# Patient Record
Sex: FEMALE | Race: BLACK OR AFRICAN AMERICAN | Employment: PART TIME | URBAN - METROPOLITAN AREA
[De-identification: names, ages, dates, MRNs, and addresses within clinical notes are randomized per-mention and may not be internally consistent; named-entity substitution may affect disease eponyms.]

---

## 2018-05-07 ENCOUNTER — HOSPITAL ENCOUNTER (OUTPATIENT)
Dept: PHYSICAL THERAPY | Age: 43
End: 2018-05-07

## 2018-05-09 ENCOUNTER — HOSPITAL ENCOUNTER (OUTPATIENT)
Dept: PHYSICAL THERAPY | Age: 43
Discharge: HOME | End: 2018-05-09
Payer: COMMERCIAL

## 2018-05-09 PROCEDURE — 97016 VASOPNEUMATIC DEVICE THERAPY: CPT

## 2018-05-09 PROCEDURE — 97161 PT EVAL LOW COMPLEX 20 MIN: CPT

## 2018-05-09 PROCEDURE — 97110 THERAPEUTIC EXERCISES: CPT

## 2018-05-09 NOTE — PROGRESS NOTES
[facility] PHYSICAL THERAPY AT [facility] [address], [address] - Phone: [phone]  Fax: [phone] / [address] [facility]  Patient Name: Titus Laguna : 1975   Medical   Diagnosis: Pain in right knee  M25.561 Treatment Diagnosis: Pain in right knee  M25.561   Onset Date: 3/19/18     Referral Source: Davion Malin MD [facility]): 2018   Prior Hospitalization: See medical history Provider #: [id number]   Prior Level of Function: Independent ambulation; reciprocal stair negotiation   Comorbidities: NA   Medications: Verified on Patient Summary List   The Plan of Care and following information is based on the information from the initial evaluation.   ===========================================================================================  Assessment / key information:  Patient is a 38 y.o. female who presents to [facility] with a diagnosis of Total knee replacement status, right [Z96.651]. Patient is her own historian. Living situation is as follows: lives with daughter in a 2 story house. Occupation: housekeeping. Pt presents with right knee pain, swelling, decreased mobility and strength s/p right TKR on 3/19/18. Pt had home health PT for 4 weeks and continues with the exercises provided to her during that time. She uses a SC to ambulate in the community and no AD at home. Pt negotiates her stairs with a step to pattern and SC. She c/o localized anterior knee pain and infrapatellar pain. No referral pattern/numbness/LOB/falls. Pt does worry about falling secondary to right knee buckling at home, but was able to correct with SC. She is currently out of work until the knee is better. She is in the process of purchasing a rubber bath mat to provide traction in the shower with bathing.     Current Deficits include: pain, edema, decreased mobility, decreased strength, and decreased postural awareness with resulting limitations in ADL's and in functional abilities. Impairments are as follows:     Pain 0/10 VAS; + hypersensitivity medial and anterior aspect of the right knee at incision     Posture BLE genu valgum and mild pes planus; left lateral trunk lean and flexed hips    Observations: incision clean, dry, intact; hypomobility right PF joint caudally    Gait: antalgic with SC and left lateral trunk lean with discontinuous steps with decreased step length LLE with decreased weightshifting onto RLE in midstance; increased right knee flexion in midstance and decreased knee flexion throughout swing phase; increased lateral propulsion and decreased BLE hip extension at terminal stance    AROM/PROM:  Left knee WNLs  Right knee: extension -8/-5 flexion 115/118    Strength:  3-/5 bilateral hip extension  Left knee flexion 4/5 extension 5/5  Right knee flexion 3+/5 extension 3+/5    Special Tests:  + Clarkes test right knee  Circumferential measures at superior patellar poles L 45cm R 47cm    Functional Tests: unable to perform a squat    Functional Deficits include: step to stair negotiation; ambulation with SC. Patient's FOTO score was a 28/100 indicating decreased function.  Patient will benefit from a POC addressing such impairments and limitations in order to improve quality of life and return to OF.    ==================================================================================  Eval Complexity: History: LOW Complexity : Zero comorbidities / personal factors that will impact the outcome / POCExam:MEDIUM Complexity : 3 Standardized tests and measures addressing body structure, function, activity limitation and / or participation in recreation  Presentation: LOW Complexity : Stable, uncomplicated  Clinical Decision Making:MEDIUM Complexity : FOTO score of 26-74Overall Complexity:LOW   Problem List: pain affecting function, decrease ROM, decrease strength, edema affecting function, impaired gait/ balance, decrease ADL/ functional abilitiies, decrease activity tolerance, decrease flexibility/ joint mobility and decrease transfer abilities   Treatment Plan may include any combination of the following: Therapeutic exercise, Therapeutic activities, Neuromuscular re-education, Physical agent/modality, Gait/balance training, Manual therapy, Patient education, Self Care training, Functional mobility training, Home safety training and Stair training  Patient / Family readiness to learn indicated by: asking questions, trying to perform skills and interest  Persons(s) to be included in education: patient (P)  Barriers to Learning/Limitations: None  Measures taken: A HEP was initiated to assist with POC in restoring function; vaso to the right knee 15min; instruction in desensitization techniques   Patient Goal (s): Walk without a cane   Patient self reported health status: good  Rehabilitation Potential: excellent     Short Term Goals: To be accomplished in  2  treatments:  1. Pt will be compliant with HEP for symptom management at home.  Long Term Goals: To be accomplished in  8-12  treatments:  1. Pt will demonstrate an increased FOTO score to 53/100 in order to improve function  2. Pt will be independent with HEP at D/C for self management. 3. Pt will demonstrate decreased edema in the right knee by 2cm in order to increase mobility necessary for stair negotiation and ambulation  4. Pt will demonstrate increased right knee AROM into extension to between 5-0 in order to aid in weightbearing and erect posture during midstance  5. Pt will demonstrate increased right knee flexion and extension strength to > or equal to 4/5 respectively in order to aid in ambulation and functional household tasks  6. Pt will be able to ambulate 10 minutes continuously without AD/LE buckling/pain in order to return to OF  7.  Pt will negotiate 12, 6in stairs with unilateral rail without SC with reciprocal pattern in order to more easily access her second floor    Frequency / Duration:   Patient to be seen  2  times per week for 8-12  treatments:  Patient / Caregiver education and instruction: exercises  G-Trevin (GP):   Therapist Signature: Nitesh Lassiter PT Date: 8/5/8105   Certification Period:  Time: 6:17 PM   ===========================================================================================  I certify that the above Physical Therapy Services are being furnished while the patient is under my care. I agree with the treatment plan and certify that this therapy is necessary. Physician Signature:        Date:       Time:     Please sign and return to [facility] or you may fax the signed copy to  [phone]. Thank you.

## 2018-05-09 NOTE — PROGRESS NOTES
PT KNEE EVAL AND TREATMENT    Patient Name: Raina Guerrero  Date:2018  : 1975  [x]  Patient  Verified  Payor: Noemi Anthony / Plan: [facility]  CAPITATED PT / Product Type: Commerical /    In time:535  Out time:630  Total Treatment Time (min): 55  Total Timed Codes (min): 40  1:1 Treatment Time ( only):    Visit #: 1 of     Treatment Area: [place] [address] pain in right knee  SUBJECTIVE  Pain Level (0-10 on visual analog scale): 0  Any medication changes, allergies to medications, diagnosis change, or new procedure performed: see summary sheet for update  Subjective functional status/changes:  Patient is a 38 y.o. female who presents to [facility] with a diagnosis of Total knee replacement status, right [Z96.651]. Patient is her own historian. Living situation is as follows: lives with daughter in a 2 story house. Occupation: housekeeping. Pt presents with right knee pain, swelling, decreased mobility and strength s/p right TKR on 3/19/18. Pt had home health PT for 4 weeks and continues with the exercises provided to her during that time. She uses a SC to ambulate in the community and no AD at home. Pt negotiates her stairs with a step to pattern and SC. She c/o localized anterior knee pain and infrapatellar pain. No referral pattern/numbness/LOB/falls. Pt does worry about falling secondary to right knee buckling at home, but was able to correct with SC. She is currently out of work until the knee is better. She is in the process of purchasing a rubber bath mat to provide traction in the shower with bathing. Current Deficits include: pain, edema, decreased mobility, decreased strength, and decreased postural awareness with resulting limitations in ADL's and in functional abilities.      Impairments are as follows:     Pain 0/10 VAS; + hypersensitivity medial and anterior aspect of the right knee at incision     Posture BLE genu valgum and mild pes planus; left lateral trunk lean and flexed hips    Observations: incision clean, dry, intact; hypomobility right PF joint caudally    Gait: antalgic with SC and left lateral trunk lean with discontinuous steps with decreased step length LLE with decreased weightshifting onto RLE in midstance; increased right knee flexion in midstance and decreased knee flexion throughout swing phase; increased lateral propulsion and decreased BLE hip extension at terminal stance    AROM/PROM:  Left knee WNLs  Right knee: extension -8/-5 flexion 115/118    Strength:  3-/5 bilateral hip extension  Left knee flexion 4/5 extension 5/5  Right knee flexion 3+/5 extension 3+/5    Special Tests:  + Clarkes test right knee  Circumferential measures at superior patellar poles L 45cm R 47cm    Functional Tests: unable to perform a squat    Modality (rationale): 15 min vaso to the right knee while elevated-skin check normal after    []  E-Stim: type _ x _ min     []att   []unatt   []w/US   []w/ice   []w/heat  []  Traction: []cerv   []pelvic   _ lbs x _ min     []pro   []sup   []int   []const  []  Ultrasound: []cont   []pulse    _ W/cm2 x _  min   []1MHz   []3MHz  []  Iontophoresis: []take home patch w/ dexamethazone    []40mA   []80mA                               []_ mA min w/: []dexamethazone   []other:_  []  Ice pack _  min     [] Hot pack _  min     [] Paraffin _  min  []  Other:     Patient Education: [x] Established HEP    [x] POT (minutes) :10 min desensitization techniques; HEP    Pain Level (0-10 scale) post treatment: 0    ASSESSMENT  [x]  See Plan of Care    PLAN  [x]  Upgrade activities as tolerated     [x] Other:_    See POC for Frequency/duration of visits     Justification for Eval Code Complexity:   Patient History : see POC  Examination: (see exam)  Clinical Presentation: stable  Clinical Decision Making : FOTO : 28 /100     G-Codes (GP):     Antonia Rendon, PT 5/9/2018  6:36 PM

## 2018-05-15 ENCOUNTER — HOSPITAL ENCOUNTER (OUTPATIENT)
Dept: PHYSICAL THERAPY | Age: 43
Discharge: HOME | End: 2018-05-15
Payer: COMMERCIAL

## 2018-05-15 PROCEDURE — 97140 MANUAL THERAPY 1/> REGIONS: CPT

## 2018-05-15 PROCEDURE — 97110 THERAPEUTIC EXERCISES: CPT

## 2018-05-15 PROCEDURE — 97016 VASOPNEUMATIC DEVICE THERAPY: CPT

## 2018-05-15 NOTE — PROGRESS NOTES
PHYSICAL THERAPY - DAILY TREATMENT NOTE    Patient Name: Jaimee Headley        Date: 5/15/2018  : 1975   YES Patient  Verified  Visit #:   2   of     Insurance: Payor: Lamont Lomeli / Plan:  [address] PT / Product Type: Commerical /      In time: 8:27am Out time: 9:17am   Total Treatment Time: 50     Medicare Time Tracking (below)   Total Timed Codes (min):  n/a 1:1 Treatment Time:  n/a     TREATMENT AREA =  Pain in right knee [M25.561]    SUBJECTIVE  Pain Level (on 0 to 10 scale):  8  / 10   Medication Changes/New allergies or changes in medical history, any new surgeries or procedures? NO    If yes, update Summary List   Subjective Functional Status/Changes:  []  No changes reported     \"last night was a bad night all night it was unbelieveable I havent had a night like that since the         OBJECTIVE  Modalities Rationale:     decrease inflammation and decrease pain to improve patient's ability to perform ADLs.      min [] Estim, type/location:                                      []  att     []  unatt     []  w/US     []  w/ice    []  w/heat    min []  Mechanical Traction: type/lbs                   []  pro   []  sup   []  int   []  cont    []  before manual    []  after manual    min []  Ultrasound, settings/location:      min []  Iontophoresis w/ dexamethasone, location:                                               []  take home patch       []  in clinic    min []  Ice     []  Heat    location/position:    10 min [x]  Vasopneumatic Device, press/temp: R knee mod pp low temp    min []  Other:    [x] Skin assessment post-treatment (if applicable):    [x]  intact    []  redness- no adverse reaction     []redness - adverse reaction:        31 min Therapeutic Exercise:  [x]  See flow sheet   Rationale:      increase ROM and increase strength to improve the patients ability to tolerate prolonged standing and walking    9 min Manual Therapy: R knee superior and inf patellar mobes gr 1-2, R knee ext and flexion PROM, Prone R knee ext PROM   Rationale:      decrease pain, increase ROM, increase tissue extensibility and decrease trigger points to improve patient's ability to ambulate with normalized gait. min Patient Education:  YES  Reviewed HEP   []  Progressed/Changed HEP based on: Other Objective/Functional Measures: Added prone knee hang, QS, QS with SLR, prone hang, prone knee ext with opp LE op, long sit ham str, bike w/u and heel slide  R knee PROM = -11 to 125 deg   R knee AROM = -15 to 120 deg     Post Treatment Pain Level (on 0 to 10) scale:   1  / 10     ASSESSMENT  Assessment/Changes in Function:     Decreased tolerance for R knee extension MT, TTP to inferior patella with superior glides. Improved R knee extension PROM in prone. Progressed R knee AAROM therex. Updated and issued HEP. []  See Progress Note/Recertification   Patient will continue to benefit from skilled PT services to modify and progress therapeutic interventions, address functional mobility deficits, address ROM deficits, address strength deficits, analyze and address soft tissue restrictions, analyze and cue movement patterns, analyze and modify body mechanics/ergonomics, assess and modify postural abnormalities, address imbalance/dizziness and instruct in home and community integration to attain remaining goals. Progress toward goals / Updated goals:    First visit after initial evaluation. Progress tx per POC.         PLAN  [x]  Upgrade activities as tolerated YES Continue plan of care   []  Discharge due to :    []  Other:      Therapist: Shelly Chase    Date: 5/15/2018 Time: 6:58 AM     Future Appointments  Date Time Provider Candy Bernal   5/15/2018 8:30 AM [address]   5/18/2018 9:00 AM [facility] PT [place] [facility]   5/22/2018 7:30 AM [address]   5/24/2018 7:30 AM Ole Doing [facility]   5/29/2018 7:30 AM Ole Doing [facility] 5/31/2018 7:30 AM [address]

## 2018-05-18 ENCOUNTER — HOSPITAL ENCOUNTER (OUTPATIENT)
Dept: PHYSICAL THERAPY | Age: 43
Discharge: HOME | End: 2018-05-18
Payer: COMMERCIAL

## 2018-05-18 PROCEDURE — 97112 NEUROMUSCULAR REEDUCATION: CPT | Performed by: PHYSICAL THERAPIST

## 2018-05-18 PROCEDURE — 97110 THERAPEUTIC EXERCISES: CPT | Performed by: PHYSICAL THERAPIST

## 2018-05-18 PROCEDURE — 97140 MANUAL THERAPY 1/> REGIONS: CPT | Performed by: PHYSICAL THERAPIST

## 2018-05-18 NOTE — PROGRESS NOTES
PT DAILY TREATMENT NOTE     Patient Name: Jamey Alvarado  Date:2018  : 1975  [x]  Patient  Verified  Payor: Cristóbal Hartmann / [facility] Type: Commerical /    In time:9:00  Out time:10:00  Total Treatment Time (min): 60  Visit #: 3 of     Treatment Area: Pain in right knee [M25.561]    SUBJECTIVE  Pain Level (0-10 scale): 2/10  Any medication changes, allergies to medications, adverse drug reactions, diagnosis change, or new procedure performed?: [x] No    [] Yes (see summary sheet for update)  Subjective functional status/changes:   [] No changes reported  Still very sore especially on inside of right knee - has been massaging to try and desensitize. Discussed elevation at home and advised she elevate knee above her heart to help decrease pain more.     OBJECTIVE    Modality rationale: decrease edema, decrease inflammation and decrease pain to improve the patients ability to walk with AD   Min Type Additional Details    [] Estim:  []Unatt       []IFC  []Premod                        []Other:  []w/ice   []w/heat  Position:  Location:    [] Estim: []Att    []TENS instruct  []NMES                    []Other:  []w/US   []w/ice   []w/heat  Position:  Location:    []  Traction: [] Cervical       []Lumbar                       [] Prone          []Supine                       []Intermittent   []Continuous Lbs:  [] before manual  [] after manual    []  Ultrasound: []Continuous   [] Pulsed                           []1MHz   []3MHz W/cm2:  Location:    []  Iontophoresis with dexamethasone         Location: [] Take home patch   [] In clinic   10 [x]  Ice     []  heat  []  Ice massage  []  Laser   []  Anodyne Position: supine - HOB elevated  Location: right knee    []  Laser with stim  []  Other:  Position:  Location:    []  Vasopneumatic Device Pressure:       [] lo [] med [] hi   Temperature: [] lo [] med [] hi   [] Skin assessment post-treatment:  [x]intact []redness- no adverse reaction    []redness - adverse reaction:       28 min Therapeutic Exercise:  [] See flow sheet :   Rationale: increase ROM and increase strength to improve the patients ability to walk without AD     12 min Neuromuscular Re-education:  []  See flow sheet :   Rationale: increase strength, improve coordination, improve balance and increase proprioception  to improve the patients ability to walk safely on all surfaces    10 min Manual Therapy:  Patellar mobs inferiorly and gentle PROM into both flexion and extension   Rationale: decrease pain, increase ROM and increase tissue extensibility to walk without AD          With   [] TE   [] TA   [] neuro   [] other: Patient Education: [x] Review HEP    [] Progressed/Changed HEP based on:   [] positioning   [] body mechanics   [] transfers   [] heat/ice application    [] other:      Other Objective/Functional Measures:     PROM:  -6 to 125 right knee     Pain Level (0-10 scale) post treatment:  1/10    ASSESSMENT/Changes in Function: Able to walk without AD during Rx today! Patient will continue to benefit from skilled PT services to modify and progress therapeutic interventions, address functional mobility deficits, address ROM deficits, address strength deficits, analyze and address soft tissue restrictions, analyze and cue movement patterns, analyze and modify body mechanics/ergonomics, assess and modify postural abnormalities and instruct in home and community integration to attain remaining goals. []  See Plan of Care  []  See progress note/recertification  []  See Discharge Summary         Progress towards goals / Updated goals:  Short Term Goals: To be accomplished in  2  treatments:  1. Pt will be compliant with HEP for symptom management at home. MET 5/18/18   Long Term Goals: To be accomplished in  8-12  treatments:  1. Pt will demonstrate an increased FOTO score to 53/100 in order to improve function  2.  Pt will be independent with HEP at D/C for self management. 3. Pt will demonstrate decreased edema in the right knee by 2cm in order to increase mobility necessary for stair negotiation and ambulation  4. Pt will demonstrate increased right knee AROM into extension to between 5-0 in order to aid in weightbearing and erect posture during midstance  5. Pt will demonstrate increased right knee flexion and extension strength to > or equal to 4/5 respectively in order to aid in ambulation and functional household tasks  6. Pt will be able to ambulate 10 minutes continuously without AD/LE buckling/pain in order to return to [facility]  7.  Pt will negotiate 12, 6in stairs with unilateral rail without SC with reciprocal pattern in order to more easily access her second floor    PLAN  [x]  Upgrade activities as tolerated     [x]  Continue plan of care  []  Update interventions per flow sheet       []  Discharge due to:_  []  Other:_      Ludmila Medina, PT 5/18/2018  10:49 AM    Future Appointments  Date Time Provider Candy Bernal   5/22/2018 7:30 AM [address]   5/24/2018 7:30 AM [address]   5/29/2018 7:30 AM [address]   5/31/2018 7:30 AM [address]

## 2018-05-21 NOTE — PROGRESS NOTES
PHYSICAL THERAPY - DAILY TREATMENT NOTE    Patient Name: Raina Guerrero        Date: 2018  : 1975   YES Patient  Verified  Visit #:   4     Insurance: Payor: Noemi Anthony / Plan: [facility] PT / Product Type: Commerical /      In time: 7:35 am Out time: 8:15am   Total Treatment Time: 40     Medicare Time Tracking (below)   Total Timed Codes (min):  n/a 1:1 Treatment Time:  n/a     TREATMENT AREA =  Pain in right knee [M25.561]    SUBJECTIVE  Pain Level (on 0 to 10 scale):  1  / 10   Medication Changes/New allergies or changes in medical history, any new surgeries or procedures? NO    If yes, update Summary List   Subjective Functional Status/Changes:  []  No changes reported     Pt states \"It just feels stiff and last night was a hard night, they told me to talk to you all today about the pain in the front of my knee like if you have a cuff around you and its cutting into you \"       OBJECTIVE  Modalities Rationale:  Deferred    32 min Therapeutic Exercise:  [x]  See flow sheet   Rationale:      increase ROM and increase strength to improve the patients ability to tolerate prolonged standing and walking    8 min Manual Therapy: R knee superior and inf patellar mobes gr 1-2, supine and prone R knee ext PROM   Rationale:      decrease pain, increase ROM, increase tissue extensibility and decrease trigger points to improve patient's ability to ambulate with normalized gait. min Patient Education:  YES  Reviewed HEP   []  Progressed/Changed HEP based on: Other Objective/Functional Measures: Added Standing TKE     Post Treatment Pain Level (on 0 to 10) scale:   1  / 10     ASSESSMENT  Assessment/Changes in Function:     Secondary to increased constant R knee pain and burning recommended patient follow up with referring MD for further follow up. Held therex to gentle supine AAROM.  TTP to medial head of the left gastroc, mild warmth to the anteromedial R knee, swelling throughout. []  See Progress Note/Recertification   Patient will continue to benefit from skilled PT services to modify and progress therapeutic interventions, address functional mobility deficits, address ROM deficits, address strength deficits, analyze and address soft tissue restrictions, analyze and cue movement patterns, analyze and modify body mechanics/ergonomics, assess and modify postural abnormalities, address imbalance/dizziness and instruct in home and community integration to attain remaining goals. Progress toward goals / Updated goals:    Progressing towards LTGs. 3. Pt will demonstrate decreased edema in the right knee by 2cm in order to increase mobility necessary for stair negotiation and ambulation  4.  Pt will demonstrate increased right knee AROM into extension to between 5-0 in order to aid in weightbearing and erect posture during midstance     PLAN  [x]  Upgrade activities as tolerated YES Continue plan of care   []  Discharge due to :    []  Other:      Therapist: Moni Ayala    Date: 5/22/2018 Time: 2:54 PM     Future Appointments  Date Time Provider Candy Bernal   5/22/2018 7:30 AM [address]   5/24/2018 7:30 AM [address]   5/29/2018 7:30 AM [address]   5/31/2018 7:30 AM [address]

## 2018-05-22 ENCOUNTER — HOSPITAL ENCOUNTER (OUTPATIENT)
Dept: PHYSICAL THERAPY | Age: 43
Discharge: HOME | End: 2018-05-22
Payer: COMMERCIAL

## 2018-05-22 PROCEDURE — 97110 THERAPEUTIC EXERCISES: CPT

## 2018-05-22 PROCEDURE — 97140 MANUAL THERAPY 1/> REGIONS: CPT

## 2018-05-23 NOTE — PROGRESS NOTES
PHYSICAL THERAPY - DAILY TREATMENT NOTE    Patient Name: Praneeth Hawkins        Date: 2018  : 1975   YES Patient  Verified  Visit #:   5     Insurance: Payor: Brianna Rider / Plan: [address] / Product Type: Commerical /      In time: 7:29am Out time: 8:35am   Total Treatment Time: 66     Medicare Time Tracking (below)   Total Timed Codes (min):  n/a 1:1 Treatment Time:  n/a     TREATMENT AREA =  Pain in right knee [M25.561]    SUBJECTIVE  Pain Level (on 0 to 10 scale):  0  / 10   Medication Changes/New allergies or changes in medical history, any new surgeries or procedures? NO    If yes, update Summary List   Subjective Functional Status/Changes:  []  No changes reported     Pt states \"its feeling better it is stiff and hurts on the inside \"         OBJECTIVE  Modalities Rationale:     decrease inflammation and decrease pain to improve patient's ability to perform ADLs.      min [] Estim, type/location:                                      []  att     []  unatt     []  w/US     []  w/ice    []  w/heat    min []  Mechanical Traction: type/lbs                   []  pro   []  sup   []  int   []  cont    []  before manual    []  after manual    min []  Ultrasound, settings/location:      min []  Iontophoresis w/ dexamethasone, location:                                               []  take home patch       []  in clinic    min []  Ice     []  Heat    location/position:    10 min [x]  Vasopneumatic Device, press/temp: R knee mod pp supine with wedge    min []  Other:    [x] Skin assessment post-treatment (if applicable):    [x]  intact    []  redness- no adverse reaction     []redness - adverse reaction:        46 min Therapeutic Exercise:  [x]  See flow sheet   Rationale:      increase ROM and increase strength to improve the patients ability to tolerate prolonged standing and walking    10 min Manual Therapy: R knee superior and inf patellar mobes gr 1-2, supine and prone R knee ext PROM with grade 3 ant tibial mobes   Rationale:      decrease pain, increase ROM, increase tissue extensibility and decrease trigger points to improve patient's ability to ambulate with normalized gait. min Patient Education:  YES  Reviewed HEP   []  Progressed/Changed HEP based on: Other Objective/Functional Measures: Added ham curl with RTB  R knee AROM = -7 to 125 deg     Post Treatment Pain Level (on 0 to 10) scale:   0  / 10     ASSESSMENT  Assessment/Changes in Function:     Cont to demonstrate decreased R knee AROM in extension. Resumed all therex as appropriate with good patient tolerance. Updated HEP with patellar mobes, education in self mobilization. []  See Progress Note/Recertification   Patient will continue to benefit from skilled PT services to modify and progress therapeutic interventions, address functional mobility deficits, address ROM deficits, address strength deficits, analyze and address soft tissue restrictions, analyze and cue movement patterns, analyze and modify body mechanics/ergonomics, assess and modify postural abnormalities, address imbalance/dizziness and instruct in home and community integration to attain remaining goals. Progress toward goals / Updated goals:    Progressing towards LTGs. 3. Pt will demonstrate decreased edema in the right knee by 2cm in order to increase mobility necessary for stair negotiation and ambulation R knee  4.  Pt will demonstrate increased right knee AROM into extension to between 5-0 in order to aid in weightbearing and erect posture during midstance     PLAN  [x]  Upgrade activities as tolerated YES Continue plan of care   []  Discharge due to :    []  Other:      Therapist: Ruddy Ram    Date: 5/24/2018 Time: 1:46 PM     Future Appointments  Date Time Provider Candy Bernal   5/24/2018 7:30 AM [address]   5/29/2018 7:30 AM [address]   5/31/2018 7:30 AM [place] [address] [facility]

## 2018-05-24 ENCOUNTER — HOSPITAL ENCOUNTER (OUTPATIENT)
Dept: PHYSICAL THERAPY | Age: 43
Discharge: HOME | End: 2018-05-24
Payer: COMMERCIAL

## 2018-05-24 PROCEDURE — 97110 THERAPEUTIC EXERCISES: CPT

## 2018-05-24 PROCEDURE — 97016 VASOPNEUMATIC DEVICE THERAPY: CPT

## 2018-05-24 PROCEDURE — 97140 MANUAL THERAPY 1/> REGIONS: CPT

## 2018-05-29 ENCOUNTER — HOSPITAL ENCOUNTER (OUTPATIENT)
Dept: PHYSICAL THERAPY | Age: 43
Discharge: HOME | End: 2018-05-29
Payer: COMMERCIAL

## 2018-05-29 PROCEDURE — 97140 MANUAL THERAPY 1/> REGIONS: CPT

## 2018-05-29 PROCEDURE — 97016 VASOPNEUMATIC DEVICE THERAPY: CPT

## 2018-05-29 PROCEDURE — 97110 THERAPEUTIC EXERCISES: CPT

## 2018-05-29 NOTE — PROGRESS NOTES
PHYSICAL THERAPY - DAILY TREATMENT NOTE    Patient Name: Sofi Brownlee        Date: 2018  : 1975   YES Patient  Verified  Visit #:   6     Insurance: Payor: Koby Rivers / [address]: [address] / Product Type: Commerical /      In time: 7:37 am Out time: 8:42am   Total Treatment Time: 65     Medicare Time Tracking (below)   Total Timed Codes (min):  n/a 1:1 Treatment Time:  n/a     TREATMENT AREA =  Pain in right knee [M25.561]    SUBJECTIVE  Pain Level (on 0 to 10 scale):  0  / 10   Medication Changes/New allergies or changes in medical history, any new surgeries or procedures? NO    If yes, update Summary List   Subjective Functional Status/Changes:  []  No changes reported     Pt states \"no pain or stiffness or anything today\"        OBJECTIVE  Modalities Rationale:     decrease inflammation and decrease pain to improve patient's ability to perform ADLs.      min [] Estim, type/location:                                      []  att     []  unatt     []  w/US     []  w/ice    []  w/heat    min []  Mechanical Traction: type/lbs                   []  pro   []  sup   []  int   []  cont    []  before manual    []  after manual    min []  Ultrasound, settings/location:      min []  Iontophoresis w/ dexamethasone, location:                                               []  take home patch       []  in clinic    min []  Ice     []  Heat    location/position:    10 min [x]  Vasopneumatic Device, press/temp: R knee, mod pp    min []  Other:    [x] Skin assessment post-treatment (if applicable):    [x]  intact    []  redness- no adverse reaction     []redness - adverse reaction:        46 min Therapeutic Exercise:  [x]  See flow sheet   Rationale:      increase ROM and increase strength to improve the patients ability to tolerate prolonged standing and walking    9 min Manual Therapy: R knee superior and inf patellar mobes gr 1-2, supine and prone R knee ext PROM with grade 3 ant tibial mobes Rationale:      decrease pain, increase ROM, increase tissue extensibility and decrease trigger points to improve patient's ability to ambulate with normalized gait. min Patient Education:  YES  Reviewed HEP   []  Progressed/Changed HEP based on: Other Objective/Functional Measures:    R knee AROM = -5 to 128 deg      Post Treatment Pain Level (on 0 to 10) scale:   0  / 10     ASSESSMENT  Assessment/Changes in Function:     Improved tolerance for superior and inferior patellar mobes. Slow but steady progress with R knee AROM. []  See Progress Note/Recertification   Patient will continue to benefit from skilled PT services to modify and progress therapeutic interventions, address functional mobility deficits, address ROM deficits, address strength deficits, analyze and address soft tissue restrictions, analyze and cue movement patterns, analyze and modify body mechanics/ergonomics, assess and modify postural abnormalities, address imbalance/dizziness and instruct in home and community integration to attain remaining goals. Progress toward goals / Updated goals:    Progressing towards all LTGs.       PLAN  [x]  Upgrade activities as tolerated YES Continue plan of care   []  Discharge due to :    []  Other:      Therapist: Josefina Hagen    Date: 5/29/2018 Time: 7:03 AM     Future Appointments  Date Time Provider Candy Bernal   5/29/2018 7:30 AM [address]   5/31/2018 7:30 AM [address]

## 2018-05-30 NOTE — PROGRESS NOTES
PHYSICAL THERAPY - DAILY TREATMENT NOTE    Patient Name: Amirah Small        Date: 2018  : 1975   YES Patient  Verified  Visit #:     Insurance: Payor: Jimbo Tate / Plan: [address] / Product Type: Commerical /      In time: 7:33 am Out time: 8:40 am   Total Treatment Time: 67     Medicare Time Tracking (below)   Total Timed Codes (min):  n/a 1:1 Treatment Time:  n/a     TREATMENT AREA =  Pain in right knee [M25.561]    SUBJECTIVE  Pain Level (on 0 to 10 scale):  3  / 10   Medication Changes/New allergies or changes in medical history, any new surgeries or procedures? NO    If yes, update Summary List   Subjective Functional Status/Changes:  []  No changes reported     It feels a little numb and hurts on out outside of the knee        OBJECTIVE  Modalities Rationale:     decrease inflammation and decrease pain to improve patient's ability to perform ADLs.      min [] Estim, type/location:                                      []  att     []  unatt     []  w/US     []  w/ice    []  w/heat    min []  Mechanical Traction: type/lbs                   []  pro   []  sup   []  int   []  cont    []  before manual    []  after manual    min []  Ultrasound, settings/location:      min []  Iontophoresis w/ dexamethasone, location:                                               []  take home patch       []  in clinic    min []  Ice     []  Heat    location/position:    10 min [x]  Vasopneumatic Device, press/temp: R knee, mod pp, 34 deg    min []  Other:    [x] Skin assessment post-treatment (if applicable):    [x]  intact    []  redness- no adverse reaction     []redness - adverse reaction:        47 min Therapeutic Exercise:  [x]  See flow sheet   Rationale:      increase ROM and increase strength to improve the patients ability to tolerate prolonged standing and walking    10 min Manual Therapy: R knee superior and inf patellar mobes gr 1-2, supine and prone R knee ext PROM with grade 3 ant tibial mobes   Rationale:      decrease pain, increase ROM, increase tissue extensibility and decrease trigger points to improve patient's ability to ambulate with normalized gait. min Patient Education:  YES  Reviewed HEP   []  Progressed/Changed HEP based on: Other Objective/Functional Measures: Added incline calf stretch  R knee AROM = -6 to 132 deg     Post Treatment Pain Level (on 0 to 10) scale:   0  / 10     ASSESSMENT  Assessment/Changes in Function:     Increase hypomobility and TTP to distal R post surgical scar. Slow but steady improvements in R knee extension. Progressed therex as appropriate with incr patient challenge. []  See Progress Note/Recertification   Patient will continue to benefit from skilled PT services to modify and progress therapeutic interventions, address functional mobility deficits, address ROM deficits, address strength deficits, analyze and address soft tissue restrictions, analyze and cue movement patterns, analyze and modify body mechanics/ergonomics, assess and modify postural abnormalities, address imbalance/dizziness and instruct in home and community integration to attain remaining goals. Progress toward goals / Updated goals:    Progressing towards all LTGs for R knee AROM.       PLAN  [x]  Upgrade activities as tolerated YES Continue plan of care   []  Discharge due to :    []  Other:      Therapist: Samantha Daniels    Date: 5/31/2018 Time: 1:56 PM     Future Appointments  Date Time Provider Candy Bernal   5/31/2018 7:30 AM [address]   6/5/2018 7:30 AM Terra Russell [facility]   6/8/2018 11:00 AM Chip Grant PTA [facility]   6/12/2018 7:30 AM [address]   6/14/2018 7:30 AM Terra Russell [facility]   6/19/2018 7:30 AM Clarhan Rede Yvonne [facility]   6/21/2018 7:00 AM Terra Russell [facility]

## 2018-05-31 ENCOUNTER — HOSPITAL ENCOUNTER (OUTPATIENT)
Dept: PHYSICAL THERAPY | Age: 43
Discharge: HOME | End: 2018-05-31
Payer: COMMERCIAL

## 2018-05-31 PROCEDURE — 97140 MANUAL THERAPY 1/> REGIONS: CPT

## 2018-05-31 PROCEDURE — 97110 THERAPEUTIC EXERCISES: CPT

## 2018-05-31 PROCEDURE — 97016 VASOPNEUMATIC DEVICE THERAPY: CPT

## 2018-06-05 ENCOUNTER — HOSPITAL ENCOUNTER (OUTPATIENT)
Dept: PHYSICAL THERAPY | Age: 43
Discharge: HOME | End: 2018-06-05
Payer: COMMERCIAL

## 2018-06-05 PROCEDURE — 97016 VASOPNEUMATIC DEVICE THERAPY: CPT

## 2018-06-05 PROCEDURE — 97110 THERAPEUTIC EXERCISES: CPT

## 2018-06-05 NOTE — PROGRESS NOTES
PHYSICAL THERAPY - DAILY TREATMENT NOTE    Patient Name: Ming Saba        Date: 2018  : 1975   YES Patient  Verified  Visit #:     Insurance: Payor: Matt Kelly / [facility]  [address] [address] [facility] / Product Type: Commerical /      In time: 7:30 am Out time: 8:15 am   Total Treatment Time: 45     Medicare Time Tracking (below)   Total Timed Codes (min):  n/a 1:1 Treatment Time:  n/a     TREATMENT AREA =  Pain in right knee [M25.561]    SUBJECTIVE  Pain Level (on 0 to 10 scale):  5  / 10   Medication Changes/New allergies or changes in medical history, any new surgeries or procedures? NO    If yes, update Summary List   Subjective Functional Status/Changes:  []  No changes reported     Pt states \"Ever since Thursday its been hurting, I havent really done my exercises, intense pain and burning in the front and under the knee cap, [place] been feeling this way for a while\"           OBJECTIVE  Modalities Rationale:     decrease pain to improve patient's ability to perform ADLs.      min [] Estim, type/location:                                      []  att     []  unatt     []  w/US     []  w/ice    []  w/heat    min []  Mechanical Traction: type/lbs                   []  pro   []  sup   []  int   []  cont    []  before manual    []  after manual    min []  Ultrasound, settings/location:      min []  Iontophoresis w/ dexamethasone, location:                                               []  take home patch       []  in clinic    min []  Ice     []  Heat    location/position:    10 min []  Vasopneumatic Device, press/temp: R knee, mod pp, 34 deg    min []  Other:    [x] Skin assessment post-treatment (if applicable):    [x]  intact    []  redness- no adverse reaction     []redness - adverse reaction:        35 min Therapeutic Exercise:  [x]  See flow sheet   Rationale:      increase ROM and increase strength to improve the patients ability to tolerate prolonged standing and walking     min Patient Education:  YES  Reviewed HEP   []  Progressed/Changed HEP based on: Other Objective/Functional Measures:    R knee AROM = -7 to 130 deg     Post Treatment Pain Level (on 0 to 10) scale:   3  / 10     ASSESSMENT  Assessment/Changes in Function:     Held MT and standing therex secondary to increased R knee soreness s/p last tx visit. Reassess goals NV for progress note. Patient follows up with MD 6/14/18, recommended patient follow up with referring MD prior to follow up visit if intense R Knee pain continues. []  See Progress Note/Recertification   Patient will continue to benefit from skilled PT services to modify and progress therapeutic interventions, address functional mobility deficits, address ROM deficits, address strength deficits, analyze and address soft tissue restrictions, analyze and cue movement patterns, analyze and modify body mechanics/ergonomics, assess and modify postural abnormalities, address imbalance/dizziness and instruct in home and community integration to attain remaining goals. Progress toward goals / Updated goals:    Progressing towards all STGs. Progress note due NV.       PLAN  [x]  Upgrade activities as tolerated YES Continue plan of care   []  Discharge due to :    []  Other:      Therapist: Heena Rivero    Date: 6/5/2018 Time: 6:55 AM     Future Appointments  Date Time Provider Candy Bernal   6/5/2018 7:30 AM [address]   6/8/2018 11:00 AM Martín Pereira PTA [facility]   6/12/2018 7:30 AM [address]   6/14/2018 7:30 AM Jose Boyle [facility]   6/19/2018 7:30 AM Jose Boyle [facility]   6/21/2018 7:00 AM Jose Boyle [facility]

## 2018-06-08 ENCOUNTER — HOSPITAL ENCOUNTER (OUTPATIENT)
Dept: PHYSICAL THERAPY | Age: 43
Discharge: HOME | End: 2018-06-08
Payer: COMMERCIAL

## 2018-06-08 PROCEDURE — 97140 MANUAL THERAPY 1/> REGIONS: CPT

## 2018-06-08 PROCEDURE — 97110 THERAPEUTIC EXERCISES: CPT

## 2018-06-08 NOTE — PROGRESS NOTES
[address] PHYSICAL THERAPY AT [facility] [address], [address] [facility], [address] - Phone: [phone]  Fax: [phone]  PROGRESS NOTE  Patient Name: Jonnathan Flores : 1975   Treatment/Medical Diagnosis: Pain in right knee [M25.561]   Referral Source: Castillo Ruiz MD     Date of Initial Visit: 18 Attended Visits: 9 Missed Visits: 0     SUMMARY OF TREATMENT  Physical Therapy treatment has consisted of Therapeutic exercise for R LE ROM, and strengthening  CURRENT STATUS  Patient has progressed well in Physical Therapy, consistently reporting improving ROM, decreasing pain, and increased functional ability. Functional improvements:decreasing pain, ROM,  Improving standing,  walking , and stair management tolerance, . Pt's current pain range is 0 to 7/10 . Pt presents with moderate sensitivity to touch medial and anterior joint capsule. Functional deficits are prolonged walking, standing tolerance ~ 5-10 min, stair management . Patient is ambulating in community with [facility]. FOTO score 33/100. AROM: -6 to 128 degrees; PROM: R knee extension: 0 degrees. Pt would benefit from continued PT intervention in order to improve knee  AROM/PROM, strength, flexibility, Functional mobility, and address remaining impairments. Goal/Measure of Progress Goal Met? 1. Pt will be compliant with HEP for symptom management at home   Status at last Eval: dependent Current Status: Pt instructed in initial HEP yes     New Goals to be achieved in __4__  weeks:  · Long Term Goals: To be accomplished in  8-12  treatments:  1. Pt will demonstrate an increased FOTO score to 53/100 in order to improve function  2. Pt will be independent with HEP at D/C for self management. 3. Pt will demonstrate decreased edema in the right knee by 2cm in order to increase mobility necessary for stair negotiation and ambulation  4.  Pt will demonstrate increased right knee AROM into extension to between 5-0 in order to aid in weightbearing and erect posture during midstance  5. Pt will demonstrate increased right knee flexion and extension strength to > or equal to 4/5 respectively in order to aid in ambulation and functional household tasks  6. Pt will be able to ambulate 10 minutes continuously without AD/LE buckling/pain in order to return to PLOF  7. Pt will negotiate 12, 6in stairs with unilateral rail without SC with reciprocal pattern in order to more easily access her second floor     RECOMMENDATIONS  Plan to continue 1-2x per week x 4 weeks  If you have any questions/comments please contact us directly at  [phone]. Thank you for allowing us to assist in the care of your patient. LPTA Signature: Monty Chaudhari PTA  Date: 6/8/2018   PT Signature: Adrian Gaitan PT Time: 9:27 AM   NOTE TO PHYSICIAN:  PLEASE COMPLETE THE ORDERS BELOW AND FAX TO   [facility] Physical Therapy: [phone]. If you are unable to process this request in 24 hours please contact our office:  [phone].    ___ I have read the above report and request that my patient continue as recommended.   ___ I have read the above report and request that my patient continue therapy with the following changes/special instructions:_________________________________________________________   ___ I have read the above report and request that my patient be discharged from therapy.      Physician Signature:        Date:       Time:

## 2018-06-08 NOTE — PROGRESS NOTES
PHYSICAL THERAPY - DAILY TREATMENT NOTE    Patient Name: Josephine Lopez        Date: 2018  : 1975   YES Patient  Verified  Visit #:     Insurance: Payor: Gem Nicolas / Plan: [address] PT / Product Type: Commerical /      In time: 11:12 am Out time: 12:39 pm   Total Treatment Time: 67     TREATMENT AREA =  Pain in right knee [M25.561]    SUBJECTIVE  Pain Level (on 0 to 10 scale): 2-3 / 10   Medication Changes/New allergies or changes in medical history, any new surgeries or procedures? NO    If yes, update Summary List   Subjective Functional Status/Changes:  []  No changes reported     I've been a little more sore. Pt reports she has been trying to go up her stairs step over with 1 rail ~ 5 x per day.          OBJECTIVE  Modalities Rationale:     decrease edema, decrease inflammation, decrease pain and increase tissue extensibility to improve patient's ability to perform walking and standing   min [] Estim, type/location:                                      []  att     []  unatt     []  w/US     []  w/ice    []  w/heat    min []  Mechanical Traction: type/lbs                   []  pro   []  sup   []  int   []  cont    []  before manual    []  after manual    min []  Ultrasound, settings/location:      min []  Iontophoresis w/ dexamethasone, location:                                               []  take home patch       []  in clinic    min []  Ice     []  Heat    location/position:    10 min [x]  Vasopneumatic Device, press/temp: 34 degrees light    min []  Other:    [x] Skin assessment post-treatment (if applicable):    [x]  intact    []  redness- no adverse reaction     []redness - adverse reaction:        47 min Therapeutic Exercise:  [x]  See flow sheet   Rationale:      increase ROM and increase strength to improve the patients ability to perform prolonged walking and standing     10 min Manual Therapy: Supine PROM into extension, pt education in scar massage and retrograde massage, use of towel for denstization   Rationale:      decrease pain, increase ROM, increase tissue extensibility and decrease edema  to improve patient's ability to decrease tissue sensitivity, improve ROM for functional ADLs         min Patient Education:  YES  Reviewed HEP   []  Progressed/Changed HEP based on: Other Objective/Functional Measures: Add standing 3 way hip   AROM: -6 to 128 degrees; PROM: knee extension: 0 degrees. FOTO: 33/100   Post Treatment Pain Level (on 0 to 10) scale:   0-1  / 10     ASSESSMENT  Assessment/Changes in Function:   Pt instruction in self massage techniques to right knee secondary to moderate sensitivity to light-medium pressure medial, anterior joint compartment. Recommended to decrease stairs at home. Review HEP     [x]  See Progress Note/Recertification   Patient will continue to benefit from skilled PT services to modify and progress therapeutic interventions, address functional mobility deficits, address ROM deficits, address strength deficits, analyze and address soft tissue restrictions and instruct in home and community integration to attain remaining goals. Progress toward goals / Updated goals:  See progress note  Add treadmill walking as tolerated.      PLAN  []  Upgrade activities as tolerated YES Continue plan of care   []  Discharge due to :    []  Other:      Therapist: Stewart Herron PTA    Date: 6/8/2018 Time: 12:39 pm     Future Appointments  Date Time Provider Candy Bernal   6/12/2018 7:30 AM Adriana [address] [place] [address]   [facility]   6/19/2018 7:30 AM [facility]   6/21/2018 7:00 AM [facility]

## 2018-06-12 ENCOUNTER — HOSPITAL ENCOUNTER (OUTPATIENT)
Dept: PHYSICAL THERAPY | Age: 43
Discharge: HOME | End: 2018-06-12
Payer: COMMERCIAL

## 2018-06-12 PROCEDURE — 97016 VASOPNEUMATIC DEVICE THERAPY: CPT

## 2018-06-12 PROCEDURE — 97110 THERAPEUTIC EXERCISES: CPT

## 2018-06-12 NOTE — PROGRESS NOTES
PHYSICAL THERAPY - DAILY TREATMENT NOTE    Patient Name: Jonnathan Flores        Date: 2018  : 1975   YES Patient  Verified  Visit #:   10) 1   of   8  Insurance: Payor: Cruzellie Vazquez / [address]: [address] / Product Type: Commerical /      In time: 7:29am Out time: 8:32   Total Treatment Time: 63     Medicare Time Tracking (below)   Total Timed Codes (min):  n/a 1:1 Treatment Time:  n/a     TREATMENT AREA =  Pain in right knee [M25.561]    SUBJECTIVE  Pain Level (on 0 to 10 scale):  0  / 10   Medication Changes/New allergies or changes in medical history, any new surgeries or procedures? NO    If yes, update Summary List   Subjective Functional Status/Changes:  []  No changes reported     Last night was the first night I slept all night since my surgery,           OBJECTIVE  Modalities Rationale:     decrease inflammation and decrease pain to improve patient's ability to perform ADLs. min [] Estim, type/location:                                      []  att     []  unatt     []  w/US     []  w/ice    []  w/heat    min []  Mechanical Traction: type/lbs                   []  pro   []  sup   []  int   []  cont    []  before manual    []  after manual    min []  Ultrasound, settings/location:      min []  Iontophoresis w/ dexamethasone, location:                                               []  take home patch       []  in clinic    min []  Ice     []  Heat    location/position:    10 min [x]  Vasopneumatic Device, press/temp: R knee, mod pp    min []  Other:    [x] Skin assessment post-treatment (if applicable):    [x]  intact    []  redness- no adverse reaction     []redness - adverse reaction:        53 min Therapeutic Exercise:  [x]  See flow sheet   Rationale:      increase ROM and increase strength to improve the patients ability to tolerate prolonged standing and walking     min Patient Education:  YES  Reviewed HEP   []  Progressed/Changed HEP based on:        Other Objective/Functional Measures: Added incline calf stretch   R knee AROM = -6 to 126 deg     Post Treatment Pain Level (on 0 to 10) scale:   0  / 10     ASSESSMENT  Assessment/Changes in Function:     Increased ease with sitting heel slide scoots thus, d/c'ed and progressed to standing knee flexion at stair. Cont to hold MT secondary to decreased tolerance, possibly resume NV. Progressing towards all STGs, PN for MD visit next tx visit. []  See Progress Note/Recertification   Patient will continue to benefit from skilled PT services to modify and progress therapeutic interventions, address functional mobility deficits, address ROM deficits, address strength deficits, analyze and address soft tissue restrictions, analyze and cue movement patterns, analyze and modify body mechanics/ergonomics, assess and modify postural abnormalities, address imbalance/dizziness and instruct in home and community integration to attain remaining goals. Progress toward goals / Updated goals:    Progressing towards all STGs.       PLAN  [x]  Upgrade activities as tolerated YES Continue plan of care   []  Discharge due to :    []  Other:      Therapist: Chaz Swartz    Date: 6/12/2018 Time: 7:30 AM     Future Appointments  Date Time Provider Candy eBrnal   6/14/2018 7:30 AM Adriana [address] [place] [address]   6/19/2018 7:30 AM Shy Combs [facility]   6/21/2018 7:00 AM Shy Combs [facility]

## 2018-06-14 ENCOUNTER — HOSPITAL ENCOUNTER (OUTPATIENT)
Dept: PHYSICAL THERAPY | Age: 43
Discharge: HOME | End: 2018-06-14
Payer: COMMERCIAL

## 2018-06-14 PROCEDURE — 97110 THERAPEUTIC EXERCISES: CPT

## 2018-06-14 NOTE — PROGRESS NOTES
[address] THERAPY  [address]ar Cho [address] -   Phone: [phone]  Fax: [phone]  [x]  PROGRESS NOTE  []   [facility] SUMMARY  Patient Name: Amirah Small : 1975   Treatment Diagnosis: Pain in right knee [M25.561]     Referral Source: Alycia Timmons MD     Date of Initial Visit: 18 Attended Visits: 11 Missed Visits: 0     SUMMARY OF TREATMENT  Therapeutic exercises including ROM, strengthening, stretching, manual therapy including joint and soft tissue manipulation, balance training, modalities: game ready, and HEP instruction. CURRENT STATUS  The pt has progressed slowly with participation in PT services, and progress limited by increased R knee pain. Currently, the patient's main complaint is constant gross R anterior knee throbbing and burning. Average right knee pain is rated at 5/10 and 7-8/10 at the worst. Right knee AROM/PROM is as follows: flexion 115/120 and extension -9/-5. Right knee strength is as follows: flexion 4/5 and extension 2+/5. Pt would benefit from continued PT services in order to improve knee AROM/PROM, strength, flexibility, functional mobility, and address remaining impairments. Goal/Measure of Progress Goal Met? 1.  Establish HEP to prevent further disability. Status at last Eval: Established Current Status: I with HEP yes   2. Pt will demonstrate increased right knee AROM into extension to between 5-0 in order to aid in weightbearing and erect posture during midstance   Status at last Eval: R knee extension AROM = -8 deg Current Status: R knee extension AROM = -9 deg no   3. Pt will demonstrate decreased edema in the right knee by 2cm in order to increase mobility necessary for stair negotiation and ambulation   Status at last Eval: R Knee edema superior patellar poles = 47 cm Current Status: R Knee edema superior patellar poles = 46.5 cm Progressing   4.   Pt will negotiate 12, 6in stairs with unilateral rail without SC with reciprocal pattern in order to more easily access her second floor   Status at last Eval: Goal established  Current Status: Reciprocal ascending and non reciprocal descending of stairs  no     New Goals to be achieved in __3-4__  Weeks:  1. Patient to be independent & compliant with progressive HEP in preparation for D/C.   2.  Pt will be able to ambulate 10 minutes continuously without AD/LE buckling/pain in order to return to PLOF   3. Pt will demonstrate increased right knee flexion and extension strength to > or equal to 4+/5 respectively in order to aid in ambulation and functional household tasks   4. Pt will demonstrate an increased FOTO score to 53/100 in order to improve function   5. Patient to demonstrate 30 sec of Right SLS on firm surface in order to improve ease with ambulation on uneven terrains. G-Codes (GP): n/a  RECOMMENDATIONS  Continue therapy with the following recommendations: 2x per week for 4-5 weeks    If you have any questions/comments please contact us directly at [phone]   Thank you for allowing us to assist in the care of your patient. Therapist Signature: Elizabeth Davila DPT Date: 6/14/2018     Time: 7:03 AM   NOTE TO PHYSICIAN:  PLEASE COMPLETE THE ORDERS BELOW AND FAX TO   [facility]: [phone]  If you are unable to process this request in 24 hours please contact our office: [phone]    ___ I have read the above report and request that my patient continue as recommended.   ___ I have read the above report and request that my patient continue therapy with the following changes/special instructions:_________________________________________________________   ___ I have read the above report and request that my patient be discharged from therapy.      Physician Signature:        Date:       Time:

## 2018-06-14 NOTE — PROGRESS NOTES
PHYSICAL THERAPY - DAILY TREATMENT NOTE    Patient Name: Issa Severino        Date: 2018  : 1975   YES Patient  Verified  Visit #:   11 2   of   8  Insurance: Payor: Merna Oats / Plan: [address] PT / Product Type: Commerical /      In time: 7:30 am Out time: 7:54 am   Total Treatment Time: 24     Medicare Time Tracking (below)   Total Timed Codes (min):  N/a 1:1 Treatment Time:  n/a     TREATMENT AREA =  Pain in right knee [M25.561]    SUBJECTIVE  Pain Level (on 0 to 10 scale):  5  / 10   Medication Changes/New allergies or changes in medical history, any new surgeries or procedures? NO    If yes, update Summary List   Subjective Functional Status/Changes:  []  No changes reported     It started last night the front of my knee feels like a rug burn          OBJECTIVE  Modalities Rationale:     decrease inflammation and decrease pain to improve patient's ability to perform ADLs. min [] Estim, type/location:                                      []  att     []  unatt     []  w/US     []  w/ice    []  w/heat    min []  Mechanical Traction: type/lbs                   []  pro   []  sup   []  int   []  cont    []  before manual    []  after manual    min []  Ultrasound, settings/location:      min []  Iontophoresis w/ dexamethasone, location:                                               []  take home patch       []  in clinic    min []  Ice     []  Heat    location/position:    10 min []  Vasopneumatic Device, press/temp: R knee, mod pp    min []  Other:    [x] Skin assessment post-treatment (if applicable):    [x]  intact    []  redness- no adverse reaction     []redness - adverse reaction:        24 min Therapeutic Exercise:  [x]  See flow sheet   Rationale:      increase ROM and increase strength to improve the patients ability to tolerate prolonged standing and walking       min Patient Education:  YES  Reviewed HEP   []  Progressed/Changed HEP based on:        Other Objective/Functional Measures:    See PN     Post Treatment Pain Level (on 0 to 10) scale:   5  / 10     ASSESSMENT  Assessment/Changes in Function:     See PN     []  See Progress Note/Recertification   Patient will continue to benefit from skilled PT services to modify and progress therapeutic interventions, address functional mobility deficits, address ROM deficits, address strength deficits, analyze and address soft tissue restrictions, analyze and cue movement patterns, analyze and modify body mechanics/ergonomics, assess and modify postural abnormalities, address imbalance/dizziness and instruct in home and community integration to attain remaining goals.    Progress toward goals / Updated goals:    See PN     PLAN  [x]  Upgrade activities as tolerated YES Continue plan of care   []  Discharge due to :    []  Other:      Therapist: Jenniffer Malone    Date: 6/14/2018 Time: 6:55 AM     Future Appointments  Date Time Provider Candy Bernal   6/14/2018 7:30 AM [address]   6/19/2018 7:30 AM Wilfredo Robles [facility]   6/21/2018 7:00 AM [address]

## 2018-06-19 ENCOUNTER — HOSPITAL ENCOUNTER (OUTPATIENT)
Dept: PHYSICAL THERAPY | Age: 43
End: 2018-06-19
Payer: COMMERCIAL

## 2018-06-20 ENCOUNTER — APPOINTMENT (OUTPATIENT)
Dept: PHYSICAL THERAPY | Age: 43
End: 2018-06-20
Payer: COMMERCIAL

## 2018-06-21 ENCOUNTER — HOSPITAL ENCOUNTER (OUTPATIENT)
Dept: PHYSICAL THERAPY | Age: 43
Discharge: HOME | End: 2018-06-21
Payer: COMMERCIAL

## 2018-06-21 PROCEDURE — 97110 THERAPEUTIC EXERCISES: CPT

## 2018-06-21 NOTE — PROGRESS NOTES
PHYSICAL THERAPY - DAILY TREATMENT NOTE    Patient Name: Alban Sullivan        Date: 2018  : 1975   YES Patient  Verified  Visit #:   12 3   of   8  Insurance: Payor: Jazmin Raman / Plan: [address] PT / Product Type: Commerical /      In time: 7:10 am Out time: 7:48am   Total Treatment Time: 38     Medicare Time Tracking (below)   Total Timed Codes (min):  n/a 1:1 Treatment Time:  n/a     TREATMENT AREA =  Pain in right knee [M25.561]    SUBJECTIVE  Pain Level (on 0 to 10 scale):  2  / 10   Medication Changes/New allergies or changes in medical history, any new surgeries or procedures? NO    If yes, update Summary List   Subjective Functional Status/Changes:  []  No changes reported     I need to be ready by the  to work          OBJECTIVE  Modalities Rationale:   PD    38 min Therapeutic Exercise:  [x]  See flow sheet   Rationale:      increase ROM and increase strength to improve the patients ability to tolerate prolonged standing and walking     min Patient Education:  YES  Reviewed HEP   []  Progressed/Changed HEP based on: Other Objective/Functional Measures: Added mini squatting and L step up      Post Treatment Pain Level (on 0 to 10) scale:   2  / 10     ASSESSMENT  Assessment/Changes in Function:     Demonstrated good performance of mini squatting with slight weight shift to L LE. Required VCing for wide KARLI and for increased hip excursion to decrease knees surpassing toes in order to improve form.  Presented with good F/L step up with slight difficulty at end range, performed step up without UEs.      []  See Progress Note/Recertification   Patient will continue to benefit from skilled PT services to modify and progress therapeutic interventions, address functional mobility deficits, address ROM deficits, address strength deficits, analyze and address soft tissue restrictions, analyze and cue movement patterns, analyze and modify body mechanics/ergonomics, assess and modify postural abnormalities, address imbalance/dizziness and instruct in home and community integration to attain remaining goals. Progress toward goals / Updated goals:    Progressing towards newly est LTGs.       PLAN  [x]  Upgrade activities as tolerated YES Continue plan of care   []  Discharge due to :    []  Other:      Therapist: Lyssa Marcial    Date: 6/21/2018 Time: 6:55 AM     Future Appointments  Date Time Provider Candy Bernal   6/25/2018 8:30 AM Lolayuan [address] [place] [address]   6/28/2018 10:00 AM [facility]   7/3/2018 8:00 AM [facility]   7/5/2018 7:00 AM [facility]

## 2018-06-25 ENCOUNTER — APPOINTMENT (OUTPATIENT)
Dept: PHYSICAL THERAPY | Age: 43
End: 2018-06-25
Payer: COMMERCIAL

## 2018-06-28 ENCOUNTER — APPOINTMENT (OUTPATIENT)
Dept: PHYSICAL THERAPY | Age: 43
End: 2018-06-28
Payer: COMMERCIAL

## 2018-07-03 ENCOUNTER — APPOINTMENT (OUTPATIENT)
Dept: PHYSICAL THERAPY | Age: 43
End: 2018-07-03
Payer: COMMERCIAL

## 2018-07-05 ENCOUNTER — HOSPITAL ENCOUNTER (OUTPATIENT)
Dept: PHYSICAL THERAPY | Age: 43
Discharge: HOME | End: 2018-07-05
Payer: COMMERCIAL

## 2018-07-05 PROCEDURE — 97110 THERAPEUTIC EXERCISES: CPT

## 2018-07-05 NOTE — PROGRESS NOTES
PHYSICAL THERAPY - DAILY TREATMENT NOTE    Patient Name: Bo Trujillo        Date: 2018  : 1975   YES Patient  Verified  Visit #:   (58) 4   of   8  Insurance: Payor: Enrique Single / Plan: [address] / Product Type: Commerical /      In time: 7:01 am Out time: 7:42am   Total Treatment Time: 41     Medicare Time Tracking (below)   Total Timed Codes (min):  n/a 1:1 Treatment Time:  n/a     TREATMENT AREA =  Pain in right knee [M25.561]    SUBJECTIVE  Pain Level (on 0 to 10 scale):  0  / 10   Medication Changes/New allergies or changes in medical history, any new surgeries or procedures? NO    If yes, update Summary List   Subjective Functional Status/Changes:  []  No changes reported     Pt states \"guru been walking without my cane for about a week\"          OBJECTIVE  Modalities Rationale:     n/a    41 (bill 30) min Therapeutic Exercise:  [x]  See flow sheet  NO REHEB EXTENDER PRESENT   Rationale:      increase ROM and increase strength to improve the patients ability to tolerate prolonged standing and walking     min Patient Education:  YES  Reviewed HEP   []  Progressed/Changed HEP based on: Other Objective/Functional Measures: Added TM and elliptical warm up     Post Treatment Pain Level (on 0 to 10) scale:   0  / 10     ASSESSMENT  Assessment/Changes in Function:     Progressed cardiovascular warm up with good patient tolerance. Progressed functional R knee strengthening in [address] with increased challenge. Demonstrated L LE shift compensation with mini squatting.       []  See Progress Note/Recertification   Patient will continue to benefit from skilled PT services to modify and progress therapeutic interventions, address functional mobility deficits, address ROM deficits, address strength deficits, analyze and address soft tissue restrictions, analyze and cue movement patterns, analyze and modify body mechanics/ergonomics, assess and modify postural abnormalities, address imbalance/dizziness and instruct in home and community integration to attain remaining goals. Progress toward goals / Updated goals:    Progressing towards STG 2.       PLAN  [x]  Upgrade activities as tolerated YES Continue plan of care   []  Discharge due to :    []  Other:      Therapist: Eugene Yip    Date: 7/5/2018 Time: 6:57 AM     Future Appointments  Date Time Provider Candy Bernal   7/5/2018 7:00 AM [address]

## 2018-07-11 ENCOUNTER — HOSPITAL ENCOUNTER (OUTPATIENT)
Dept: PHYSICAL THERAPY | Age: 43
Discharge: HOME | End: 2018-07-11
Payer: COMMERCIAL

## 2018-07-11 PROCEDURE — 97140 MANUAL THERAPY 1/> REGIONS: CPT

## 2018-07-11 PROCEDURE — 97110 THERAPEUTIC EXERCISES: CPT

## 2018-07-11 NOTE — PROGRESS NOTES
PHYSICAL THERAPY - DAILY TREATMENT NOTE    Patient Name: Aaron Core        Date: 2018  : 1975   YES Patient  Verified  Visit #:   (86) 5   of   8  Insurance: Payor: Mariposa Phillips / Plan: [address] [place] [address] [address] PT / Product Type: Commerical /      In time: 7:32 am Out time: 8:40 am   Total Treatment Time: 68     Medicare Time Tracking (below)   Total Timed Codes (min):  n/a 1:1 Treatment Time:  n/a     TREATMENT AREA =  Pain in right knee [M25.561]    SUBJECTIVE  Pain Level (on 0 to 10 scale):  0  / 10   Medication Changes/New allergies or changes in medical history, any new surgeries or procedures? NO    If yes, update Summary List   Subjective Functional Status/Changes:  []  No changes reported     Pt reports I've been more sore this week. I think because I tried going back to the gym over the weekend. Pt reports going to gym Sat, Sun, and Monday. Bike 30 min; TM walkin min each day. Yesterday I paid for it. OBJECTIVE  Modalities Rationale:   N/a      55 min Therapeutic Exercise:  [x]  See flow sheet   Rationale:      increase ROM, increase strength, improve balance and increase proprioception to improve the patients ability to perform functional ADLs     8 min Manual Therapy: Supine patella glides gr 1;PROM into flexion ; pt instruction in retrograde massage technique. Rationale:      decrease pain, increase ROM and increase tissue extensibility to improve patient's ability to improve tissue mobility in ADLs    5 min gait training: gait training on stairs with reciprocal gait and bilateral rails 4 steps x 4    min Patient Education:  YES  Reviewed HEP   []  Progressed/Changed HEP based on: Other Objective/Functional Measures:  AROM: extension: 0 degrees after stretching     Post Treatment Pain Level (on 0 to 10) scale:  0  / 10     ASSESSMENT  Assessment/Changes in Function:   Pt presents with moderate sensitivity in distal incision.  Pt education in desensitization techniques with towel and retrograde massage as tolerated. Advanced LE strengthening in standing. Pt challenged in SLS on Right and eccentric quad lowering off 2 inch step indicating pt would benefit from continued quad strengthening. pt education in slow progression into gym program.      []  See Progress Note/Recertification   Patient will continue to benefit from skilled PT services to modify and progress therapeutic interventions, address functional mobility deficits, address ROM deficits, address strength deficits, analyze and address soft tissue restrictions and instruct in home and community integration to attain remaining goals. Progress toward goals / Updated goals:  1. Patient to be independent & compliant with progressive HEP in preparation for D/C.-goal in progress   2. Pt will be able to ambulate 10 minutes continuously without AD/LE buckling/pain in order to return to PLOF- goal met 7-11-18   3. Pt will demonstrate increased right knee flexion and extension strength to > or equal to 4+/5 respectively in order to aid in ambulation and functional household tasks   4. Pt will demonstrate an increased FOTO score to 53/100 in order to improve function   5. Patient to demonstrate 30 sec of Right SLS on firm surface in order to improve ease with ambulation on uneven terrains. PLAN  []  Upgrade activities as tolerated YES Continue plan of care   []  Discharge due to :    []  Other:      Therapist: Maggy Felder PTA    Date: 7/11/2018 Time: 8:40  AM     No future appointments.

## 2018-07-12 ENCOUNTER — HOSPITAL ENCOUNTER (OUTPATIENT)
Dept: PHYSICAL THERAPY | Age: 43
Discharge: HOME | End: 2018-07-12
Payer: COMMERCIAL

## 2018-07-12 PROCEDURE — 97140 MANUAL THERAPY 1/> REGIONS: CPT

## 2018-07-12 PROCEDURE — 97110 THERAPEUTIC EXERCISES: CPT

## 2018-07-12 NOTE — PROGRESS NOTES
[facility] AT [facility] [address], [address] [facility] [address] - Phone: [phone]  Fax: [phone]  PROGRESS NOTE  Patient Name: Veronica Smith : 1975   Treatment/Medical Diagnosis: Pain in right knee [M25.561]   Referral Source: Otilio Gaffney MD     Date of Initial Visit: 18 Attended Visits: 15 Missed Visits: 1     SUMMARY OF TREATMENT  Therapeutic exercises including ROM, strengthening, stretching, manual therapy including joint and soft tissue manipulation, balance training, modalities: game ready, and HEP instruction. CURRENT STATUS  The pt has progressed slowly with participation in PT services, and progress limited by increased R knee pain. Currently, the patient's main complaint is intermittent gross R anterior knee throbbing and burning. Average right knee pain is rated at 1/10 to 6/10 at the worst. Right knee AROM/PROM is as follows: flexion 115/120 and extension -9/-5. Pt would benefit from continued PT services in order to improve knee AROM/PROM, strength, flexibility, functional mobility, and address remaining impairments    Goal/Measure of Progress Goal Met? 1. Patient to be independent & compliant with progressive HEP in preparation for D/C. Status at last Eval: Instructed in initial HEP. Current Status: D/C HEP In progress yes   2. Pt will be able to ambulate 10 minutes continuously without AD/LE buckling/pain in order to return to PLOF   Status at last Eval: Pt ambulating with SPC Current Status: Pt ambulating ~ 10 min with good form and no AD. . yes   3.   Pt will demonstrate increased right knee flexion and extension strength to > or equal to 4+/5 respectively in order to aid in ambulation and functional household tasks   Status at last Eval: MMT: flex: 4/5; ext: 2+/5 Current Status: MMT: flex: 4+ /5 ;   Ext::4/4+  /5 yes   4.  4. Pt will demonstrate an increased FOTO score to 53/100 in order to improve function. 5. Patient to demonstrate 30 sec of Right SLS on firm surface in order to improve ease with ambulation on uneven terrains   Status at last Eval: FOTO: 33 Current Status: FOTO: 50  SLS firm surface: 30 seconds. Goal in progress     New Goals to be achieved in _2-4__  weeks:  1. Patient to be independent & compliant with progressive HEP in preparation for D/C.  2. Pt will demonstrate increased right knee flexion and extension strength to > or equal to 5/5 respectively in order to aid in ambulation and functional household tasks  3. Pt will demonstrate an increased FOTO score to 53/100 in order to improve function. 4. Patient to demonstrate 30 sec of Right SLS on foam surface in order to improve ease with ambulation on uneven terrains    RECOMMENDATIONS  Plan to continue 2x per week x 2-4 weeks. If you have any questions/comments please contact us directly at  [phone]. Thank you for allowing us to assist in the care of your patient. LPTA Signature: Troy Snider PTA  Date: 7/12/2018   PT Signature: Silvestre Siemens, DPT Time: 9:24 AM   NOTE TO PHYSICIAN:  PLEASE COMPLETE THE ORDERS BELOW AND FAX TO   [facility]: [phone] If you are unable to process this request in 24 hours please contact our office:  [phone].    ___ I have read the above report and request that my patient continue as recommended.   ___ I have read the above report and request that my patient continue therapy with the following changes/special instructions:_________________________________________________________   ___ I have read the above report and request that my patient be discharged from therapy.      Physician Signature:        Date:       Time:

## 2018-07-12 NOTE — PROGRESS NOTES
PHYSICAL THERAPY - DAILY TREATMENT NOTE    Patient Name: Kati Cerrato        Date: 2018  : 1975   YES Patient  Verified  Visit #:   (94) 6   of   8  Insurance: Payor: Neal Parada / Plan: [address] PT / Product Type: Commerical /      In time: 1:58 pm Out time: 3:13pm   Total Treatment Time: 61         TREATMENT AREA =  Pain in right knee [M25.561]    SUBJECTIVE  Pain Level (on 0 to 10 scale):  0  / 10   Medication Changes/New allergies or changes in medical history, any new surgeries or procedures? NO    If yes, update Summary List   Subjective Functional Status/Changes:  []  No changes reported     Pt reports they lifted restrictions to use cane. No AD ~ 3 weeks. 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 OBJECTIVE  Modalities Rationale:  N/a     53 min Therapeutic Exercise:  [x]  See flow sheet   Rationale:      increase ROM, increase strength, improve balance and increase proprioception to improve the patients ability to perform bending, squatting,stooping    8 min Manual Therapy: Supine patella glides gr 1-2, PROM right knee extension; review scar massage   Rationale:      decrease pain, increase ROM, increase tissue extensibility and decrease edema  to improve patient's ability to improve tissue mobility toward full ROM       min Patient Education:  YES  Reviewed HEP   []  Progressed/Changed HEP based on:        Other Objective/Functional Measures:    AROM: knee extension: 0 degrees  FOTO: 53   Post Treatment Pain Level (on 0 to 10) scale:   0  / 10     ASSESSMENT  Assessment/Changes in Function:     See progress note     [x]  See Progress Note/Recertification   Patient will continue to benefit from skilled PT services to modify and progress therapeutic interventions, address functional mobility deficits, address ROM deficits, address strength deficits, analyze and address soft tissue restrictions and instruct in home and community integration to attain remaining goals.    Progress toward goals / Updated goals:  See progress note     PLAN  []  Upgrade activities as tolerated YES Continue plan of care   []  Discharge due to :    []  Other:      Therapist: Troy Snider PTA    Date: 7/12/2018 Time: 3:13  PM     Future Appointments  Date Time Provider Candy Bernal   7/12/2018 2:00 PM Troy Snider PTA [facility]   7/16/2018 9:30 AM rToy Snider PTA [facility]   7/18/2018 8:00 AM Troy Snider PTA [facility]   7/24/2018 8:30 AM [facility]   7/26/2018 8:30 AM [facility]   7/30/2018 7:30 AM Troy nSider PTA [facility]

## 2018-07-16 ENCOUNTER — HOSPITAL ENCOUNTER (OUTPATIENT)
Dept: PHYSICAL THERAPY | Age: 43
Discharge: HOME | End: 2018-07-16
Payer: COMMERCIAL

## 2018-07-16 PROCEDURE — 97110 THERAPEUTIC EXERCISES: CPT

## 2018-07-16 PROCEDURE — 97016 VASOPNEUMATIC DEVICE THERAPY: CPT

## 2018-07-16 NOTE — PROGRESS NOTES
PHYSICAL THERAPY - DAILY TREATMENT NOTE    Patient Name: Aaron Richardson        Date: 2018  : 1975   YES Patient  Verified  Visit #:   (28) 7   of   8  Insurance: Payor: Mariposa Phillips / Plan: [address] / Product Type: Commerical /      In time: 8:36 am Out time: 9:50 am   Total Treatment Time: 74     Medicare Time Tracking (below)   Total Timed Codes (min):n/a 1:1 Treatment Time:  n/a     TREATMENT AREA =  Pain in right knee [M25.561]    SUBJECTIVE  Pain Level (on 0 to 10 scale):  2  / 10   Medication Changes/New allergies or changes in medical history, any new surgeries or procedures? NO    If yes, update Summary List   Subjective Functional Status/Changes:  []  No changes reported   Pt reports \"Its sore today. \"  She did 20 min on the treadmill yesterday.  Pt reports swelling in right lower extremity to calf       OBJECTIVE  Modalities Rationale:     decrease edema, decrease inflammation, decrease pain and increase tissue extensibility to improve patient's ability to perform prolonged walking and standing   min [] Estim, type/location:                                      []  att     []  unatt     []  w/US     []  w/ice    []  w/heat    min []  Mechanical Traction: type/lbs                   []  pro   []  sup   []  int   []  cont    []  before manual    []  after manual    min []  Ultrasound, settings/location:      min []  Iontophoresis w/ dexamethasone, location:                                               []  take home patch       []  in clinic    min []  Ice     []  Heat    location/position:    10 min [x]  Vasopneumatic Device, press/temp:     min []  Other:    [x] Skin assessment post-treatment (if applicable):    [x]  intact    []  redness- no adverse reaction     []redness - adverse reaction:        57 min Therapeutic Exercise:  [x]  See flow sheet   Rationale:      increase ROM and increase strength to improve the patients ability to perform prolonged walking and standing 7 min Patient Education:  YES  Reviewed HEP   []  Progressed/Changed HEP based on: Other Objective/Functional Measures:    No exercise progression secondary to pt challenged by current program  Decreased time on treadmill walking x 5 min. Post Treatment Pain Level (on 0 to 10) scale:   2  / 10     ASSESSMENT  Assessment/Changes in Function:   Pt demonstrating improving eccentric quad strength in lateral step downs and forward step ups. Resume ice with compression secondary to increased edema and soreness this session. []  See Progress Note/Recertification   Patient will continue to benefit from skilled PT services to modify and progress therapeutic interventions, address functional mobility deficits, address ROM deficits, address strength deficits and instruct in home and community integration to attain remaining goals. Progress toward goals / Updated goals:  Continue toward all current updated long term goals.       PLAN  []  Upgrade activities as tolerated YES Continue plan of care   []  Discharge due to :    []  Other:      Therapist: Haley Dacosta PTA    Date: 7/16/2018 Time: 9:50  AM     Future Appointments  Date Time Provider Candy Bernal   7/16/2018 9:30 AM Haley Dacosta PTA [facility]   7/18/2018 8:00 AM Haley Dacosta PTA [facility]   7/24/2018 8:30 AM Vignesh Gutierrez [facility]   7/26/2018 8:30 AM Vignesh Gutierrez [facility]   7/30/2018 7:30 AM Haley Dacosta PTA [facility]

## 2018-07-18 ENCOUNTER — APPOINTMENT (OUTPATIENT)
Dept: PHYSICAL THERAPY | Age: 43
End: 2018-07-18
Payer: COMMERCIAL

## 2018-07-19 ENCOUNTER — APPOINTMENT (OUTPATIENT)
Dept: PHYSICAL THERAPY | Age: 43
End: 2018-07-19
Payer: COMMERCIAL

## 2018-07-24 ENCOUNTER — APPOINTMENT (OUTPATIENT)
Dept: PHYSICAL THERAPY | Age: 43
End: 2018-07-24
Payer: COMMERCIAL

## 2018-07-26 ENCOUNTER — APPOINTMENT (OUTPATIENT)
Dept: PHYSICAL THERAPY | Age: 43
End: 2018-07-26
Payer: COMMERCIAL

## 2018-07-30 ENCOUNTER — APPOINTMENT (OUTPATIENT)
Dept: PHYSICAL THERAPY | Age: 43
End: 2018-07-30
Payer: COMMERCIAL

## 2018-09-06 NOTE — PROGRESS NOTES
[facility] [address], [address], [address] - Phone: [phone]  Fax: [phone] SUMMARY  Patient Name: Nallely Wright : 1975   Treatment/Medical Diagnosis: Pain in right knee [M25.561]   Referral Source: Sameera Li MD     Date of Initial Visit: 18 Attended Visits: 16 Missed Visits: 4     SUMMARY OF TREATMENT  Therapeutic exercises including ROM, strengthening, stretching, manual therapy including joint and soft tissue manipulation, balance training, modalities: game ready, and HEP instruction. CURRENT STATUS  Pt was unable to be formally reassessed secondary to not returning to PT after her 18 appointment    Goal/Measure of Progress Goal Met? 1. Patient to be independent & compliant with progressive HEP in preparation for D/C. Statu.s at last Eval: D/C HEP in progress Current Status: Unable to be reassessed yes   2. Pt will demonstrate increased right knee flexion and extension strength to > or equal to 5/5 respectively in order to aid in ambulation and functional household tasks   Status at last Eval: MMT: flex: 4+ /5 ;   Ext::4/4+  /5 Current Status: Unable to be reassessed. no   3. Pt will demonstrate an increased FOTO score to 53/100 in order to improve function. Status at last Eval: 50 Current Status: Unable to be reassessed no   4. Patient to demonstrate 30 sec of Right SLS on foam surface in order to improve ease with ambulation on uneven terrains   Status at last Eval: SLS: 30 seconds firm surface Current Status: Unable to be reassessed. no     RECOMMENDATIONS  Discontinue therapy. Progressing towards or have reached established goals. Did not return. If you have any questions/comments please contact us directly at [phone]. Thank you for allowing us to assist in the care of your patient.     LPTA Signature: Jody Lentz Date: 18   Therapist Signature: Brittni Muller, DPT Time: 1:53 PM

## 2021-09-17 ENCOUNTER — OFFICE VISIT (OUTPATIENT)
Dept: ORTHOPEDIC SURGERY | Age: 46
End: 2021-09-17
Payer: COMMERCIAL

## 2021-09-17 VITALS
RESPIRATION RATE: 16 BRPM | OXYGEN SATURATION: 99 % | WEIGHT: 207 LBS | BODY MASS INDEX: 34.49 KG/M2 | HEIGHT: 65 IN | TEMPERATURE: 97 F | HEART RATE: 77 BPM

## 2021-09-17 DIAGNOSIS — M18.12 PRIMARY OSTEOARTHRITIS OF FIRST CARPOMETACARPAL JOINT OF LEFT HAND: ICD-10-CM

## 2021-09-17 DIAGNOSIS — M19.031 PRIMARY OSTEOARTHRITIS OF RIGHT WRIST: Primary | ICD-10-CM

## 2021-09-17 DIAGNOSIS — M18.11 PRIMARY OSTEOARTHRITIS OF FIRST CARPOMETACARPAL JOINT OF RIGHT HAND: ICD-10-CM

## 2021-09-17 PROCEDURE — 99203 OFFICE O/P NEW LOW 30 MIN: CPT | Performed by: ORTHOPAEDIC SURGERY

## 2021-09-17 PROCEDURE — 20600 DRAIN/INJ JOINT/BURSA W/O US: CPT | Performed by: ORTHOPAEDIC SURGERY

## 2021-09-17 PROCEDURE — 20605 DRAIN/INJ JOINT/BURSA W/O US: CPT | Performed by: ORTHOPAEDIC SURGERY

## 2021-09-17 RX ORDER — CYCLOBENZAPRINE HCL 10 MG
10 TABLET ORAL
COMMUNITY

## 2021-09-17 NOTE — PROGRESS NOTES
Madiha Flores is a 48 y.o. female right handed unspecified employment. Worker's Compensation and legal considerations: none filed. Vitals:    09/17/21 1318   Pulse: 77   Resp: 16   Temp: 97 °F (36.1 °C)   TempSrc: Temporal   SpO2: 99%   Weight: 207 lb (93.9 kg)   Height: 5' 5\" (1.651 m)   PainSc:   8   PainLoc: Hand           Chief Complaint   Patient presents with    Hand Pain     bilateral hand pain         HPI: Patient presents today with complaints of bilateral basilar thumb pain and right wrist pain. Date of onset: Indeterminate    Injury: No    Prior Treatment:  No    Numbness/ Tingling: No      ROS: Review of Systems - General ROS: negative  Psychological ROS: negative  ENT ROS: negative  Allergy and Immunology ROS: negative  Hematological and Lymphatic ROS: negative  Respiratory ROS: no cough, shortness of breath, or wheezing  Cardiovascular ROS: no chest pain or dyspnea on exertion  Gastrointestinal ROS: no abdominal pain, change in bowel habits, or black or bloody stools  Musculoskeletal ROS: negative  Neurological ROS: negative  Dermatological ROS: negative    Past Medical History:   Diagnosis Date    Thromboembolus ([address] 1989    After delivery of her daughter       Past Surgical History:   Procedure Laterality Date    HX CHOLECYSTECTOMY      HX HEENT      Mouth surgery to remove teeth    HX HERNIA REPAIR  2001    HX HYSTERECTOMY  2014    Partial    HX KNEE ARTHROSCOPY  2007       Current Outpatient Medications   Medication Sig Dispense Refill    cyclobenzaprine (FLEXERIL) 10 mg tablet Take 10 mg by mouth three (3) times daily as needed for Muscle Spasm(s).  acetaminophen (TYLENOL) 325 mg tablet Take  by mouth every four (4) hours as needed for Pain.  OTHER Flexiril (Patient not taking: Reported on 9/17/2021)      oxyCODONE-acetaminophen (PERCOCET) 5-325 mg per tablet Take 1-2 Tabs by mouth every four (4) hours as needed for Pain. Max Daily Amount: 12 Tabs.  (Patient not taking: Reported on 9/17/2021) 40 Tab 0     Current Facility-Administered Medications   Medication Dose Route Frequency Provider Last Rate Last Admin    triamcinolone acetonide (KENALOG) 10 mg/mL injection 15 mg  15 mg Intra artICUlar ONCE Jonnathan Hernandez NINODO           No Known Allergies        PE:     Physical Exam  Vitals and nursing note reviewed. Constitutional:       General: She is not in acute distress. Appearance: Normal appearance. She is not ill-appearing. Cardiovascular:      Pulses: Normal pulses. Pulmonary:      Effort: Pulmonary effort is normal. No respiratory distress. Musculoskeletal:         General: Swelling and tenderness present. No deformity or signs of injury. Cervical back: Normal range of motion and neck supple. Right lower leg: No edema. Left lower leg: No edema. Skin:     General: Skin is warm and dry. Capillary Refill: Capillary refill takes less than 2 seconds. Findings: No bruising or erythema. Neurological:      General: No focal deficit present. Mental Status: She is alert and oriented to person, place, and time. Cranial Nerves: No cranial nerve deficit. Sensory: No sensory deficit. Psychiatric:         Mood and Affect: Mood normal.         Behavior: Behavior normal.            Wrist: Wrist pain and tenderness significantly localized to the dorsal ulnar aspect of the right wrist.    Tenderness L R Test L R   1st Ext Comp - - Finkelstein's - -   Snuff Box - - Alvarez - -   2nd Ext Comp - - S-L Shear - -   S-L Joint - - L-T Shear - -   L-T Joint - - DRUJ Sup - -   6th Ext Comp - ++ DRUJ Pro - -   Ulnar Snuff - ++ DRUJ Grind - -   Fovea - - TFCC - -   STT Joint - - Mid-Carp Inst - -   FCR - - P-T Grind - -   Intersection - - ECU Sublux.  - -      Dorsal Ganglion: -   Volar Ganglion: -      ROM: Full      Hand:    Examination L Digit(s) R Digit(s)   1st CMC Tenderness ++  +    1st CMC Grind +  +    Beto Nodes -  -    Heberden Nodes -  - A1 Pulley Tenderness -  -    Triggering -  -    UCL Instability -  -    RCL Instability -  -    Lateral Stress Pain -  -    Palmar Cords -  -    Tabletop test -  -    Garrod's Pads -  -     Strength       Pinch Strength         ROM: Full        Imaging:     WET READ: 9/17/2021 3 views of bilateral wrist significant for moderate to severe degenerative changes at the thumb CMC joints. Additionally there are moderate degenerative changes at the right wrist radiocarpal joint. ICD-10-CM ICD-9-CM    1. Primary osteoarthritis of right wrist  M19.031 715.13 XR WRIST RT AP/LAT/OBL MIN 3V      AMB SUPPLY ORDER      triamcinolone acetonide (KENALOG) 10 mg/mL injection 15 mg      DRAIN/INJECT INTERMEDIATE JOINT/BURSA   2. Primary osteoarthritis of first carpometacarpal joint of left hand  M18.12 715.14 XR WRIST LT AP/LAT/OBL MIN 3V      AMB SUPPLY ORDER      DRAIN/INJECT SMALL JOINT/BURSA      triamcinolone acetonide (KENALOG) 10 mg/mL injection 15 mg   3. Primary osteoarthritis of first carpometacarpal joint of right hand  M18.11 715.14 DRAIN/INJECT SMALL JOINT/BURSA      triamcinolone acetonide (KENALOG) 10 mg/mL injection 15 mg         Plan:     Bilateral thumb CMC joint injections and right wrist joint injection. Left thumb CMC brace and right wrist universal brace. Follow-up and Dispositions    · Return if symptoms worsen or fail to improve.           Plan was reviewed with patient, who verbalized agreement and understanding of the plan    [address] [facility] NOTE        Chart reviewed for the following:   Jonnathan ELLINGTON DO, have reviewed the History, Physical and updated the Allergic reactions for Elysiayogi Bansal performed immediately prior to start of procedure:   Jonnathan ELLINGTON DO, have performed the following reviews on [facility] prior to the start of the procedure:            * Patient was identified by name and date of birth   * Agreement on procedure being performed was verified  * Risks and Benefits explained to the patient  * Procedure site verified and marked as necessary  * Patient was positioned for comfort  * Consent was signed and verified     Time: 13:48      Date of procedure: 9/17/2021    Procedure performed by: Cindy Baker DO    Provider assisted by: Orestes Ang MA    Patient assisted by: self    How tolerated by patient: tolerated the procedure well with no complications    Post Procedural Pain Scale: 0 - No Hurt    Comments: none    Procedure:  After consent was obtained, using sterile technique the bilateral thumb bases and right wrist was prepped. Local anesthetic used: 1% lidocaine. Kenalog 5 mg X3 and was then injected and the needle withdrawn. The procedure was well tolerated. The patient is asked to continue to rest the area for a few more days before resuming regular activities. It may be more painful for the first 1-2 days. Watch for fever, or increased swelling or persistent pain in the joint. Call or return to clinic prn if such symptoms occur or there is failure to improve as anticipated.